# Patient Record
Sex: MALE | Employment: OTHER | URBAN - METROPOLITAN AREA
[De-identification: names, ages, dates, MRNs, and addresses within clinical notes are randomized per-mention and may not be internally consistent; named-entity substitution may affect disease eponyms.]

---

## 2024-03-01 ENCOUNTER — MEDICAL CORRESPONDENCE (OUTPATIENT)
Dept: HEALTH INFORMATION MANAGEMENT | Facility: CLINIC | Age: 64
End: 2024-03-01

## 2024-04-26 RX ORDER — GABAPENTIN 100 MG/1
100 CAPSULE ORAL 2 TIMES DAILY
COMMUNITY

## 2024-04-26 RX ORDER — NORTRIPTYLINE HCL 25 MG
25 CAPSULE ORAL AT BEDTIME
COMMUNITY

## 2024-04-26 RX ORDER — HYDROCODONE BITARTRATE AND ACETAMINOPHEN 5; 325 MG/1; MG/1
1 TABLET ORAL DAILY PRN
Status: ON HOLD | COMMUNITY
End: 2024-05-03

## 2024-04-26 RX ORDER — FUROSEMIDE 20 MG
40 TABLET ORAL DAILY
COMMUNITY

## 2024-04-26 RX ORDER — ALLOPURINOL 100 MG/1
100 TABLET ORAL DAILY
COMMUNITY

## 2024-04-26 RX ORDER — GENTAMICIN SULFATE 1 MG/G
CREAM TOPICAL DAILY
COMMUNITY

## 2024-04-26 RX ORDER — ASPIRIN 81 MG/1
81 TABLET ORAL DAILY
Status: ON HOLD | COMMUNITY
End: 2024-05-03

## 2024-04-26 RX ORDER — SPIRONOLACTONE 25 MG/1
25 TABLET ORAL DAILY
COMMUNITY

## 2024-04-26 RX ORDER — CARVEDILOL 25 MG/1
25 TABLET ORAL 2 TIMES DAILY WITH MEALS
COMMUNITY

## 2024-04-26 RX ORDER — ROSUVASTATIN CALCIUM 5 MG/1
5 TABLET, COATED ORAL DAILY
COMMUNITY

## 2024-04-26 RX ORDER — LISINOPRIL 20 MG/1
20 TABLET ORAL DAILY
COMMUNITY

## 2024-04-26 RX ORDER — CELECOXIB 100 MG/1
100 CAPSULE ORAL 2 TIMES DAILY
COMMUNITY

## 2024-04-26 RX ORDER — TADALAFIL 20 MG/1
20 TABLET ORAL DAILY PRN
COMMUNITY

## 2024-05-01 ENCOUNTER — ANESTHESIA EVENT (OUTPATIENT)
Dept: SURGERY | Facility: CLINIC | Age: 64
End: 2024-05-01
Payer: MEDICARE

## 2024-05-01 NOTE — ANESTHESIA PREPROCEDURE EVALUATION
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   Not on File   Social History     Tobacco Use     Smoking status: Former     Types: Cigarettes     Smokeless tobacco: Former     Quit date: 1996   Substance Use Topics     Alcohol use: Not on file      Wt Readings from Last 1 Encounters:   No data found for Wt        Anesthesia Evaluation            ROS/MED HX  ENT/Pulmonary:     (+) sleep apnea, uses CPAP,                                      Neurologic:     (+)    peripheral neuropathy,                            Cardiovascular:     (+) Dyslipidemia hypertension- -  CAD -  - -                                 Previous cardiac testing   Echo: Date: 24 Results:  Summary:    * The left ventricle is normal in size.     * The estimated ejection fraction is 60-65%.     * There is mild concentric left ventricular hypertrophy.     * The left ventricular diastolic function is abnormal (Grade I).     * Normal right ventricular systolic function.     * The aortic valve is calcified.     * There is  "moderate aortic stenosis with a peak velocity of  347 cm/s,   mean   gradient of  29.04 mmHg, aortic valve area of  0.93 cm2, and an NDSI of    0.28.    * Mild mitral annular calcification.     * The IVC is normal in size (< 2.1 cm), > 50% respiratory variance, RA   pressure normal at 3 mmHg.     * Prior study from 02/06/2023.     * Compared to prior study, there is no significant change.     Stress Test:  Date: Results:    ECG Reviewed:  Date: Results:    Cath:  Date: Results:      METS/Exercise Tolerance:     Hematologic:       Musculoskeletal:       GI/Hepatic:       Renal/Genitourinary:     (+) renal disease, type: CRI,            Endo:     (+)               Obesity,       Psychiatric/Substance Use:  - neg psychiatric ROS     Infectious Disease:       Malignancy:       Other:            Physical Exam    Airway        Mallampati: II   TM distance: > 3 FB   Neck ROM: full   Mouth opening: > 3 cm    Respiratory Devices and Support         Dental       (+) Modest Abnormalities - crowns, retainers, 1 or 2 missing teeth      Cardiovascular   cardiovascular exam normal          Pulmonary   pulmonary exam normal            OUTSIDE LABS:  CBC: No results found for: \"WBC\", \"HGB\", \"HCT\", \"PLT\"  BMP: No results found for: \"NA\", \"POTASSIUM\", \"CHLORIDE\", \"CO2\", \"BUN\", \"CR\", \"GLC\"  COAGS: No results found for: \"PTT\", \"INR\", \"FIBR\"  POC: No results found for: \"BGM\", \"HCG\", \"HCGS\"  HEPATIC: No results found for: \"ALBUMIN\", \"PROTTOTAL\", \"ALT\", \"AST\", \"GGT\", \"ALKPHOS\", \"BILITOTAL\", \"BILIDIRECT\", \"LISA\"  OTHER: No results found for: \"PH\", \"LACT\", \"A1C\", \"DIEGO\", \"PHOS\", \"MAG\", \"LIPASE\", \"AMYLASE\", \"TSH\", \"T4\", \"T3\", \"CRP\", \"SED\"    Anesthesia Plan    ASA Status:  3    NPO Status:  NPO Appropriate    Anesthesia Type: General.     - Airway: LMA   Induction: Intravenous, Propofol.   Maintenance: Balanced.        Consents    Anesthesia Plan(s) and associated risks, benefits, and realistic alternatives discussed. Questions answered and " patient/representative(s) expressed understanding.     - Discussed:     - Discussed with:  Patient            Postoperative Care    Pain management: Peripheral nerve block (Single Shot), Multi-modal analgesia.   PONV prophylaxis: Ondansetron (or other 5HT-3), Dexamethasone or Solumedrol     Comments:               Matt Sorto, DO, DO    I have reviewed the pertinent notes and labs in the chart from the past 30 days and (re)examined the patient.  Any updates or changes from those notes are reflected in this note.             # Drug Induced Platelet Defect: home medication list includes an antiplatelet medication

## 2024-05-02 ENCOUNTER — APPOINTMENT (OUTPATIENT)
Dept: GENERAL RADIOLOGY | Facility: CLINIC | Age: 64
End: 2024-05-02
Attending: ORTHOPAEDIC SURGERY
Payer: MEDICARE

## 2024-05-02 ENCOUNTER — HOSPITAL ENCOUNTER (OUTPATIENT)
Facility: CLINIC | Age: 64
Discharge: HOME | End: 2024-05-03
Attending: ORTHOPAEDIC SURGERY | Admitting: ORTHOPAEDIC SURGERY
Payer: MEDICARE

## 2024-05-02 ENCOUNTER — ANESTHESIA (OUTPATIENT)
Dept: SURGERY | Facility: CLINIC | Age: 64
End: 2024-05-02
Payer: MEDICARE

## 2024-05-02 DIAGNOSIS — Z98.890 HISTORY OF ANKLE SURGERY: ICD-10-CM

## 2024-05-02 PROBLEM — M19.079 ANKLE ARTHRITIS: Status: ACTIVE | Noted: 2024-05-02

## 2024-05-02 LAB — GLUCOSE BLDC GLUCOMTR-MCNC: 114 MG/DL (ref 70–99)

## 2024-05-02 PROCEDURE — 250N000011 HC RX IP 250 OP 636: Performed by: ANESTHESIOLOGY

## 2024-05-02 PROCEDURE — 360N000083 HC SURGERY LEVEL 3 W/ FLUORO, PER MIN: Performed by: ORTHOPAEDIC SURGERY

## 2024-05-02 PROCEDURE — 271N000001 HC OR GENERAL SUPPLY NON-STERILE: Performed by: ORTHOPAEDIC SURGERY

## 2024-05-02 PROCEDURE — 250N000025 HC SEVOFLURANE, PER MIN: Performed by: ORTHOPAEDIC SURGERY

## 2024-05-02 PROCEDURE — 250N000011 HC RX IP 250 OP 636: Performed by: NURSE ANESTHETIST, CERTIFIED REGISTERED

## 2024-05-02 PROCEDURE — 250N000011 HC RX IP 250 OP 636: Performed by: ORTHOPAEDIC SURGERY

## 2024-05-02 PROCEDURE — L8699 PROSTHETIC IMPLANT NOS: HCPCS | Performed by: ORTHOPAEDIC SURGERY

## 2024-05-02 PROCEDURE — 710N000009 HC RECOVERY PHASE 1, LEVEL 1, PER MIN: Performed by: ORTHOPAEDIC SURGERY

## 2024-05-02 PROCEDURE — 370N000017 HC ANESTHESIA TECHNICAL FEE, PER MIN: Performed by: ORTHOPAEDIC SURGERY

## 2024-05-02 PROCEDURE — 272N000001 HC OR GENERAL SUPPLY STERILE: Performed by: ORTHOPAEDIC SURGERY

## 2024-05-02 PROCEDURE — 250N000009 HC RX 250: Performed by: ORTHOPAEDIC SURGERY

## 2024-05-02 PROCEDURE — 272N000002 HC OR SUPPLY OTHER OPNP: Performed by: ORTHOPAEDIC SURGERY

## 2024-05-02 PROCEDURE — 250N000013 HC RX MED GY IP 250 OP 250 PS 637

## 2024-05-02 PROCEDURE — 250N000009 HC RX 250: Performed by: NURSE ANESTHETIST, CERTIFIED REGISTERED

## 2024-05-02 PROCEDURE — 250N000009 HC RX 250

## 2024-05-02 PROCEDURE — 999N000141 HC STATISTIC PRE-PROCEDURE NURSING ASSESSMENT: Performed by: ORTHOPAEDIC SURGERY

## 2024-05-02 PROCEDURE — 250N000009 HC RX 250: Performed by: ANESTHESIOLOGY

## 2024-05-02 PROCEDURE — 999N000179 XR SURGERY CARM FLUORO LESS THAN 5 MIN W STILLS

## 2024-05-02 PROCEDURE — 258N000003 HC RX IP 258 OP 636: Performed by: ANESTHESIOLOGY

## 2024-05-02 PROCEDURE — 27870 FUSION OF ANKLE JOINT OPEN: CPT | Performed by: ANESTHESIOLOGY

## 2024-05-02 PROCEDURE — C1713 ANCHOR/SCREW BN/BN,TIS/BN: HCPCS | Performed by: ORTHOPAEDIC SURGERY

## 2024-05-02 PROCEDURE — 250N000011 HC RX IP 250 OP 636

## 2024-05-02 PROCEDURE — 82962 GLUCOSE BLOOD TEST: CPT

## 2024-05-02 PROCEDURE — 258N000003 HC RX IP 258 OP 636: Performed by: NURSE ANESTHETIST, CERTIFIED REGISTERED

## 2024-05-02 PROCEDURE — 27870 FUSION OF ANKLE JOINT OPEN: CPT | Performed by: NURSE ANESTHETIST, CERTIFIED REGISTERED

## 2024-05-02 PROCEDURE — 258N000003 HC RX IP 258 OP 636

## 2024-05-02 PROCEDURE — C1762 CONN TISS, HUMAN(INC FASCIA): HCPCS | Performed by: ORTHOPAEDIC SURGERY

## 2024-05-02 DEVICE — GRAFT BONE INFUSE BMP SM 7510200: Type: IMPLANTABLE DEVICE | Site: ANKLE | Status: FUNCTIONAL

## 2024-05-02 RX ORDER — HYDROMORPHONE HCL IN WATER/PF 6 MG/30 ML
0.4 PATIENT CONTROLLED ANALGESIA SYRINGE INTRAVENOUS EVERY 5 MIN PRN
Status: DISCONTINUED | OUTPATIENT
Start: 2024-05-02 | End: 2024-05-02

## 2024-05-02 RX ORDER — ALLOPURINOL 100 MG/1
100 TABLET ORAL DAILY
Status: DISCONTINUED | OUTPATIENT
Start: 2024-05-03 | End: 2024-05-03 | Stop reason: HOSPADM

## 2024-05-02 RX ORDER — AMOXICILLIN 250 MG
1 CAPSULE ORAL 2 TIMES DAILY
Status: DISCONTINUED | OUTPATIENT
Start: 2024-05-02 | End: 2024-05-03 | Stop reason: HOSPADM

## 2024-05-02 RX ORDER — OXYCODONE HYDROCHLORIDE 5 MG/1
10 TABLET ORAL EVERY 4 HOURS PRN
Status: DISCONTINUED | OUTPATIENT
Start: 2024-05-02 | End: 2024-05-03 | Stop reason: HOSPADM

## 2024-05-02 RX ORDER — LISINOPRIL 20 MG/1
20 TABLET ORAL DAILY
Status: DISCONTINUED | OUTPATIENT
Start: 2024-05-02 | End: 2024-05-03 | Stop reason: HOSPADM

## 2024-05-02 RX ORDER — OXYCODONE HYDROCHLORIDE 5 MG/1
5-10 TABLET ORAL EVERY 4 HOURS PRN
Qty: 30 TABLET | Refills: 0 | Status: SHIPPED | OUTPATIENT
Start: 2024-05-02

## 2024-05-02 RX ORDER — SODIUM CHLORIDE, SODIUM LACTATE, POTASSIUM CHLORIDE, CALCIUM CHLORIDE 600; 310; 30; 20 MG/100ML; MG/100ML; MG/100ML; MG/100ML
INJECTION, SOLUTION INTRAVENOUS CONTINUOUS
Status: DISCONTINUED | OUTPATIENT
Start: 2024-05-02 | End: 2024-05-02 | Stop reason: HOSPADM

## 2024-05-02 RX ORDER — ONDANSETRON 2 MG/ML
4 INJECTION INTRAMUSCULAR; INTRAVENOUS EVERY 30 MIN PRN
Status: DISCONTINUED | OUTPATIENT
Start: 2024-05-02 | End: 2024-05-02

## 2024-05-02 RX ORDER — ONDANSETRON 4 MG/1
4 TABLET, ORALLY DISINTEGRATING ORAL EVERY 30 MIN PRN
Status: DISCONTINUED | OUTPATIENT
Start: 2024-05-02 | End: 2024-05-02

## 2024-05-02 RX ORDER — AMOXICILLIN 250 MG
1-2 CAPSULE ORAL 2 TIMES DAILY
Qty: 10 TABLET | Refills: 0 | Status: SHIPPED | OUTPATIENT
Start: 2024-05-02

## 2024-05-02 RX ORDER — ACETAMINOPHEN 325 MG/1
650 TABLET ORAL EVERY 4 HOURS PRN
Status: DISCONTINUED | OUTPATIENT
Start: 2024-05-05 | End: 2024-05-03 | Stop reason: HOSPADM

## 2024-05-02 RX ORDER — PROCHLORPERAZINE MALEATE 5 MG
5 TABLET ORAL EVERY 6 HOURS PRN
Status: DISCONTINUED | OUTPATIENT
Start: 2024-05-02 | End: 2024-05-03 | Stop reason: HOSPADM

## 2024-05-02 RX ORDER — HYDROMORPHONE HCL IN WATER/PF 6 MG/30 ML
0.4 PATIENT CONTROLLED ANALGESIA SYRINGE INTRAVENOUS
Status: DISCONTINUED | OUTPATIENT
Start: 2024-05-02 | End: 2024-05-03 | Stop reason: HOSPADM

## 2024-05-02 RX ORDER — NALOXONE HYDROCHLORIDE 0.4 MG/ML
0.2 INJECTION, SOLUTION INTRAMUSCULAR; INTRAVENOUS; SUBCUTANEOUS
Status: DISCONTINUED | OUTPATIENT
Start: 2024-05-02 | End: 2024-05-03 | Stop reason: HOSPADM

## 2024-05-02 RX ORDER — LIDOCAINE 40 MG/G
CREAM TOPICAL
Status: DISCONTINUED | OUTPATIENT
Start: 2024-05-02 | End: 2024-05-02 | Stop reason: HOSPADM

## 2024-05-02 RX ORDER — SODIUM CHLORIDE, SODIUM LACTATE, POTASSIUM CHLORIDE, CALCIUM CHLORIDE 600; 310; 30; 20 MG/100ML; MG/100ML; MG/100ML; MG/100ML
INJECTION, SOLUTION INTRAVENOUS CONTINUOUS
Status: DISCONTINUED | OUTPATIENT
Start: 2024-05-02 | End: 2024-05-03 | Stop reason: HOSPADM

## 2024-05-02 RX ORDER — BISACODYL 10 MG
10 SUPPOSITORY, RECTAL RECTAL DAILY PRN
Status: DISCONTINUED | OUTPATIENT
Start: 2024-05-05 | End: 2024-05-03 | Stop reason: HOSPADM

## 2024-05-02 RX ORDER — ONDANSETRON 2 MG/ML
4 INJECTION INTRAMUSCULAR; INTRAVENOUS EVERY 6 HOURS PRN
Status: DISCONTINUED | OUTPATIENT
Start: 2024-05-02 | End: 2024-05-03 | Stop reason: HOSPADM

## 2024-05-02 RX ORDER — CEFAZOLIN SODIUM/WATER 2 G/20 ML
2 SYRINGE (ML) INTRAVENOUS
Status: COMPLETED | OUTPATIENT
Start: 2024-05-02 | End: 2024-05-02

## 2024-05-02 RX ORDER — NALOXONE HYDROCHLORIDE 0.4 MG/ML
0.1 INJECTION, SOLUTION INTRAMUSCULAR; INTRAVENOUS; SUBCUTANEOUS
Status: DISCONTINUED | OUTPATIENT
Start: 2024-05-02 | End: 2024-05-02

## 2024-05-02 RX ORDER — HYDROXYZINE HYDROCHLORIDE 10 MG/1
10 TABLET, FILM COATED ORAL EVERY 6 HOURS PRN
Status: DISCONTINUED | OUTPATIENT
Start: 2024-05-02 | End: 2024-05-03 | Stop reason: HOSPADM

## 2024-05-02 RX ORDER — IBUPROFEN 600 MG/1
600 TABLET, FILM COATED ORAL EVERY 6 HOURS PRN
Status: DISCONTINUED | OUTPATIENT
Start: 2024-05-02 | End: 2024-05-03 | Stop reason: HOSPADM

## 2024-05-02 RX ORDER — ASPIRIN 325 MG
325 TABLET, DELAYED RELEASE (ENTERIC COATED) ORAL DAILY
Status: DISCONTINUED | OUTPATIENT
Start: 2024-05-02 | End: 2024-05-03 | Stop reason: HOSPADM

## 2024-05-02 RX ORDER — CEFAZOLIN SODIUM/WATER 2 G/20 ML
2 SYRINGE (ML) INTRAVENOUS SEE ADMIN INSTRUCTIONS
Status: DISCONTINUED | OUTPATIENT
Start: 2024-05-02 | End: 2024-05-02 | Stop reason: HOSPADM

## 2024-05-02 RX ORDER — FENTANYL CITRATE 0.05 MG/ML
50 INJECTION, SOLUTION INTRAMUSCULAR; INTRAVENOUS EVERY 5 MIN PRN
Status: DISCONTINUED | OUTPATIENT
Start: 2024-05-02 | End: 2024-05-02

## 2024-05-02 RX ORDER — SPIRONOLACTONE 25 MG/1
25 TABLET ORAL DAILY
Status: DISCONTINUED | OUTPATIENT
Start: 2024-05-03 | End: 2024-05-03 | Stop reason: HOSPADM

## 2024-05-02 RX ORDER — CEFAZOLIN SODIUM/WATER 2 G/20 ML
2 SYRINGE (ML) INTRAVENOUS EVERY 8 HOURS
Qty: 40 ML | Refills: 0 | Status: DISCONTINUED | OUTPATIENT
Start: 2024-05-02 | End: 2024-05-02

## 2024-05-02 RX ORDER — HYDROMORPHONE HCL IN WATER/PF 6 MG/30 ML
0.2 PATIENT CONTROLLED ANALGESIA SYRINGE INTRAVENOUS
Status: DISCONTINUED | OUTPATIENT
Start: 2024-05-02 | End: 2024-05-03 | Stop reason: HOSPADM

## 2024-05-02 RX ORDER — TRANEXAMIC ACID 10 MG/ML
1 INJECTION, SOLUTION INTRAVENOUS ONCE
Status: DISCONTINUED | OUTPATIENT
Start: 2024-05-02 | End: 2024-05-02 | Stop reason: HOSPADM

## 2024-05-02 RX ORDER — PROPOFOL 10 MG/ML
INJECTION, EMULSION INTRAVENOUS PRN
Status: DISCONTINUED | OUTPATIENT
Start: 2024-05-02 | End: 2024-05-02

## 2024-05-02 RX ORDER — ACETAMINOPHEN 325 MG/1
975 TABLET ORAL EVERY 8 HOURS
Status: DISCONTINUED | OUTPATIENT
Start: 2024-05-02 | End: 2024-05-03 | Stop reason: HOSPADM

## 2024-05-02 RX ORDER — MAGNESIUM HYDROXIDE 1200 MG/15ML
LIQUID ORAL PRN
Status: DISCONTINUED | OUTPATIENT
Start: 2024-05-02 | End: 2024-05-02 | Stop reason: HOSPADM

## 2024-05-02 RX ORDER — ONDANSETRON 4 MG/1
4 TABLET, ORALLY DISINTEGRATING ORAL EVERY 6 HOURS PRN
Status: DISCONTINUED | OUTPATIENT
Start: 2024-05-02 | End: 2024-05-03 | Stop reason: HOSPADM

## 2024-05-02 RX ORDER — CARVEDILOL 6.25 MG/1
25 TABLET ORAL 2 TIMES DAILY WITH MEALS
Status: DISCONTINUED | OUTPATIENT
Start: 2024-05-02 | End: 2024-05-03

## 2024-05-02 RX ORDER — HYDROMORPHONE HCL IN WATER/PF 6 MG/30 ML
0.2 PATIENT CONTROLLED ANALGESIA SYRINGE INTRAVENOUS EVERY 5 MIN PRN
Status: DISCONTINUED | OUTPATIENT
Start: 2024-05-02 | End: 2024-05-02

## 2024-05-02 RX ORDER — FENTANYL CITRATE 0.05 MG/ML
25 INJECTION, SOLUTION INTRAMUSCULAR; INTRAVENOUS EVERY 5 MIN PRN
Status: DISCONTINUED | OUTPATIENT
Start: 2024-05-02 | End: 2024-05-02

## 2024-05-02 RX ORDER — GABAPENTIN 100 MG/1
100 CAPSULE ORAL 2 TIMES DAILY
Status: DISCONTINUED | OUTPATIENT
Start: 2024-05-02 | End: 2024-05-03 | Stop reason: HOSPADM

## 2024-05-02 RX ORDER — ASPIRIN 325 MG
325 TABLET, DELAYED RELEASE (ENTERIC COATED) ORAL DAILY
Qty: 42 TABLET | Refills: 0 | Status: SHIPPED | OUTPATIENT
Start: 2024-05-02

## 2024-05-02 RX ORDER — DEXMEDETOMIDINE HYDROCHLORIDE 4 UG/ML
INJECTION, SOLUTION INTRAVENOUS PRN
Status: DISCONTINUED | OUTPATIENT
Start: 2024-05-02 | End: 2024-05-02

## 2024-05-02 RX ORDER — OXYCODONE HYDROCHLORIDE 5 MG/1
5 TABLET ORAL EVERY 4 HOURS PRN
Status: DISCONTINUED | OUTPATIENT
Start: 2024-05-02 | End: 2024-05-03 | Stop reason: HOSPADM

## 2024-05-02 RX ORDER — ROSUVASTATIN CALCIUM 5 MG/1
5 TABLET, COATED ORAL DAILY
Status: DISCONTINUED | OUTPATIENT
Start: 2024-05-03 | End: 2024-05-03 | Stop reason: HOSPADM

## 2024-05-02 RX ORDER — NORTRIPTYLINE HCL 25 MG
25 CAPSULE ORAL AT BEDTIME
Status: DISCONTINUED | OUTPATIENT
Start: 2024-05-02 | End: 2024-05-03 | Stop reason: HOSPADM

## 2024-05-02 RX ORDER — CEFAZOLIN SODIUM 2 G/100ML
2 INJECTION, SOLUTION INTRAVENOUS EVERY 8 HOURS
Status: COMPLETED | OUTPATIENT
Start: 2024-05-02 | End: 2024-05-03

## 2024-05-02 RX ORDER — NALOXONE HYDROCHLORIDE 0.4 MG/ML
0.4 INJECTION, SOLUTION INTRAMUSCULAR; INTRAVENOUS; SUBCUTANEOUS
Status: DISCONTINUED | OUTPATIENT
Start: 2024-05-02 | End: 2024-05-03 | Stop reason: HOSPADM

## 2024-05-02 RX ORDER — SODIUM CHLORIDE, SODIUM LACTATE, POTASSIUM CHLORIDE, CALCIUM CHLORIDE 600; 310; 30; 20 MG/100ML; MG/100ML; MG/100ML; MG/100ML
INJECTION, SOLUTION INTRAVENOUS CONTINUOUS
Status: DISCONTINUED | OUTPATIENT
Start: 2024-05-02 | End: 2024-05-02

## 2024-05-02 RX ORDER — DEXAMETHASONE SODIUM PHOSPHATE 4 MG/ML
4 INJECTION, SOLUTION INTRA-ARTICULAR; INTRALESIONAL; INTRAMUSCULAR; INTRAVENOUS; SOFT TISSUE
Status: COMPLETED | OUTPATIENT
Start: 2024-05-02 | End: 2024-05-02

## 2024-05-02 RX ORDER — LIDOCAINE HYDROCHLORIDE 20 MG/ML
INJECTION, SOLUTION INFILTRATION; PERINEURAL PRN
Status: DISCONTINUED | OUTPATIENT
Start: 2024-05-02 | End: 2024-05-02

## 2024-05-02 RX ORDER — FUROSEMIDE 40 MG
40 TABLET ORAL DAILY
Status: DISCONTINUED | OUTPATIENT
Start: 2024-05-02 | End: 2024-05-03 | Stop reason: HOSPADM

## 2024-05-02 RX ORDER — LIDOCAINE 40 MG/G
CREAM TOPICAL
Status: DISCONTINUED | OUTPATIENT
Start: 2024-05-02 | End: 2024-05-03 | Stop reason: HOSPADM

## 2024-05-02 RX ORDER — HYDROXYZINE HYDROCHLORIDE 10 MG/1
10 TABLET, FILM COATED ORAL EVERY 6 HOURS PRN
Qty: 30 TABLET | Refills: 0 | Status: SHIPPED | OUTPATIENT
Start: 2024-05-02

## 2024-05-02 RX ORDER — TRANEXAMIC ACID 10 MG/ML
1 INJECTION, SOLUTION INTRAVENOUS ONCE
Status: COMPLETED | OUTPATIENT
Start: 2024-05-02 | End: 2024-05-02

## 2024-05-02 RX ORDER — POLYETHYLENE GLYCOL 3350 17 G/17G
17 POWDER, FOR SOLUTION ORAL DAILY
Status: DISCONTINUED | OUTPATIENT
Start: 2024-05-03 | End: 2024-05-03 | Stop reason: HOSPADM

## 2024-05-02 RX ADMIN — PROPOFOL 300 MG: 10 INJECTION, EMULSION INTRAVENOUS at 07:47

## 2024-05-02 RX ADMIN — TRANEXAMIC ACID 1 G: 10 INJECTION, SOLUTION INTRAVENOUS at 09:45

## 2024-05-02 RX ADMIN — BUPIVACAINE HYDROCHLORIDE 25 ML: 5 INJECTION, SOLUTION EPIDURAL; INTRACAUDAL at 07:05

## 2024-05-02 RX ADMIN — CARVEDILOL 25 MG: 6.25 TABLET, FILM COATED ORAL at 18:22

## 2024-05-02 RX ADMIN — SODIUM CHLORIDE, POTASSIUM CHLORIDE, SODIUM LACTATE AND CALCIUM CHLORIDE: 600; 310; 30; 20 INJECTION, SOLUTION INTRAVENOUS at 07:38

## 2024-05-02 RX ADMIN — SODIUM CHLORIDE, POTASSIUM CHLORIDE, SODIUM LACTATE AND CALCIUM CHLORIDE: 600; 310; 30; 20 INJECTION, SOLUTION INTRAVENOUS at 06:58

## 2024-05-02 RX ADMIN — DEXMEDETOMIDINE HYDROCHLORIDE 4 MCG: 200 INJECTION INTRAVENOUS at 09:38

## 2024-05-02 RX ADMIN — FUROSEMIDE 40 MG: 40 TABLET ORAL at 13:06

## 2024-05-02 RX ADMIN — SENNOSIDES AND DOCUSATE SODIUM 1 TABLET: 50; 8.6 TABLET ORAL at 21:06

## 2024-05-02 RX ADMIN — SENNOSIDES AND DOCUSATE SODIUM 1 TABLET: 50; 8.6 TABLET ORAL at 13:06

## 2024-05-02 RX ADMIN — DEXAMETHASONE SODIUM PHOSPHATE 4 MG: 4 INJECTION, SOLUTION INTRA-ARTICULAR; INTRALESIONAL; INTRAMUSCULAR; INTRAVENOUS; SOFT TISSUE at 07:52

## 2024-05-02 RX ADMIN — MIDAZOLAM 2 MG: 1 INJECTION INTRAMUSCULAR; INTRAVENOUS at 07:40

## 2024-05-02 RX ADMIN — PHENYLEPHRINE HYDROCHLORIDE 200 MCG: 10 INJECTION INTRAVENOUS at 08:07

## 2024-05-02 RX ADMIN — PHENYLEPHRINE HYDROCHLORIDE 150 MCG: 10 INJECTION INTRAVENOUS at 08:01

## 2024-05-02 RX ADMIN — TRANEXAMIC ACID 1 G: 10 INJECTION, SOLUTION INTRAVENOUS at 07:45

## 2024-05-02 RX ADMIN — GABAPENTIN 100 MG: 100 CAPSULE ORAL at 13:06

## 2024-05-02 RX ADMIN — ASPIRIN 325 MG: 325 TABLET, COATED ORAL at 13:06

## 2024-05-02 RX ADMIN — LIDOCAINE HYDROCHLORIDE 60 MG: 20 INJECTION, SOLUTION INFILTRATION; PERINEURAL at 07:47

## 2024-05-02 RX ADMIN — SODIUM CHLORIDE, POTASSIUM CHLORIDE, SODIUM LACTATE AND CALCIUM CHLORIDE: 600; 310; 30; 20 INJECTION, SOLUTION INTRAVENOUS at 12:05

## 2024-05-02 RX ADMIN — LISINOPRIL 20 MG: 20 TABLET ORAL at 13:06

## 2024-05-02 RX ADMIN — Medication 3 G: at 07:38

## 2024-05-02 RX ADMIN — GABAPENTIN 100 MG: 100 CAPSULE ORAL at 21:06

## 2024-05-02 RX ADMIN — CEFAZOLIN SODIUM 2 G: 2 INJECTION, SOLUTION INTRAVENOUS at 16:22

## 2024-05-02 RX ADMIN — DEXMEDETOMIDINE HYDROCHLORIDE 8 MCG: 200 INJECTION INTRAVENOUS at 09:36

## 2024-05-02 RX ADMIN — PHENYLEPHRINE HYDROCHLORIDE 0.5 MCG/KG/MIN: 10 INJECTION INTRAVENOUS at 08:04

## 2024-05-02 RX ADMIN — PHENYLEPHRINE HYDROCHLORIDE 200 MCG: 10 INJECTION INTRAVENOUS at 08:03

## 2024-05-02 RX ADMIN — PHENYLEPHRINE HYDROCHLORIDE 200 MCG: 10 INJECTION INTRAVENOUS at 08:19

## 2024-05-02 RX ADMIN — PHENYLEPHRINE HYDROCHLORIDE 100 MCG: 10 INJECTION INTRAVENOUS at 10:07

## 2024-05-02 RX ADMIN — ACETAMINOPHEN 975 MG: 325 TABLET, FILM COATED ORAL at 13:06

## 2024-05-02 RX ADMIN — MIDAZOLAM 2 MG: 1 INJECTION INTRAMUSCULAR; INTRAVENOUS at 07:00

## 2024-05-02 RX ADMIN — FENTANYL CITRATE 25 MCG: 0.05 INJECTION, SOLUTION INTRAMUSCULAR; INTRAVENOUS at 08:44

## 2024-05-02 RX ADMIN — SODIUM CHLORIDE, POTASSIUM CHLORIDE, SODIUM LACTATE AND CALCIUM CHLORIDE: 600; 310; 30; 20 INJECTION, SOLUTION INTRAVENOUS at 10:05

## 2024-05-02 RX ADMIN — FENTANYL CITRATE 50 MCG: 0.05 INJECTION, SOLUTION INTRAMUSCULAR; INTRAVENOUS at 07:47

## 2024-05-02 RX ADMIN — DEXMEDETOMIDINE HYDROCHLORIDE 8 MCG: 200 INJECTION INTRAVENOUS at 08:57

## 2024-05-02 RX ADMIN — ACETAMINOPHEN 975 MG: 325 TABLET, FILM COATED ORAL at 21:06

## 2024-05-02 RX ADMIN — PHENYLEPHRINE HYDROCHLORIDE 100 MCG: 10 INJECTION INTRAVENOUS at 07:56

## 2024-05-02 RX ADMIN — FENTANYL CITRATE 25 MCG: 0.05 INJECTION, SOLUTION INTRAMUSCULAR; INTRAVENOUS at 09:25

## 2024-05-02 RX ADMIN — NORTRIPTYLINE HYDROCHLORIDE 25 MG: 25 CAPSULE ORAL at 21:11

## 2024-05-02 RX ADMIN — PHENYLEPHRINE HYDROCHLORIDE 200 MCG: 10 INJECTION INTRAVENOUS at 07:47

## 2024-05-02 RX ADMIN — ONDANSETRON 4 MG: 2 INJECTION INTRAMUSCULAR; INTRAVENOUS at 07:52

## 2024-05-02 RX ADMIN — PHENYLEPHRINE HYDROCHLORIDE 150 MCG: 10 INJECTION INTRAVENOUS at 08:05

## 2024-05-02 ASSESSMENT — ACTIVITIES OF DAILY LIVING (ADL)
ADLS_ACUITY_SCORE: 34
ADLS_ACUITY_SCORE: 35
ADLS_ACUITY_SCORE: 31
ADLS_ACUITY_SCORE: 34
ADLS_ACUITY_SCORE: 35
ADLS_ACUITY_SCORE: 35
ADLS_ACUITY_SCORE: 34
ADLS_ACUITY_SCORE: 35
ADLS_ACUITY_SCORE: 34
ADLS_ACUITY_SCORE: 35
ADLS_ACUITY_SCORE: 35
ADLS_ACUITY_SCORE: 34

## 2024-05-02 NOTE — OP NOTE
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    PROCEDURE(S):   Right ankle arthrodesis.  Right subtalar arthrodesis (combined as a tibiotalocalcaneal arthrodesis with an intramedullary nail).  Right distal fibula resection.  Right ankle and tibia fluoroscopic imaging and interpretation including AP, mortise, and lateral views.    ATTENDING SURGEON: Denilson Chawla MD.    ASSISTANT SURGEON: Urvashi Curran PA-C.    ANESTHESIA: General with regional nerve block.    EBL: Minimal.    TOURNIQUET TIME: 120 minutes.    IMPLANTS: [employer] DynaNail with associated interlocking screws; distal fibula autograft and crushed cancellous allograft; Infuse.    COMPLICATIONS: Iatrogenic diaphyseal posterior tibia fracture sustained during impaction of the intramedullary nail, and addressed with exchange for a longer nail with fixation proximal to the fracture site.    A skilled surgical assistant, Urvashi Curran PA-C, was necessary and utilized during the case to assist with patient positioning, prepping and draping, completing the soft tissue/bone work, wound closure, and dressing and splint application.  The assistant was utilized throughout the entire case.    INDICATIONS: Isrrael is a pleasant 64 year-old gentleman who presented to my clinic for evaluation of chronic right ankle pain and deformity.  Physical examination and imaging studies demonstrated evidence of end-stage valgus ankle degenerative joint disease with significant erosion of the tibial plafond and talar dome.  Given the patient's chronic discomfort despite extensive conservative measures, the above operative intervention was offered.  After full discussion of the benefits and risks of surgery, the patient provided informed consent to proceed.    The patient was identified in the pre-operative holding area on the date of surgery.  The operative site  was marked with indelible marker and the patient was brought back to the operating room and transferred to the operating table in a supine position.  All bony prominences were well-padded.  Anesthesia was administered without complication.  The right lower extremity was prepped and draped in standard sterile fashion.  A pre-operative timeout was performed identifying the correct patient, procedure, operative site, antibiotic administration, and equipment necessary for the procedure.    After Esmarch exsanguination, an upper thigh tourniquet was inflated.  A longitudinal incision was made directly over the distal fibula.  Soft tissue dissection was carefully carried down maintaining excellent hemostasis and avoiding injury to the superficial peroneal nerve.  The peroneal fascia was sharply incised exposing the distal fibula.  Sharp dissection was carried down adjacent to the distal fibula, isolating the bone from the surrounding soft tissue.  An oscillating saw was utilized to complete an oblique osteotomy through the distal diaphysis of the fibula.  The distal fibula was then resected from the wound, and a small amount of the cancellous bone at the tip of the fibula was harvested for bone graft.    Adequate exposure of the lateral aspect of the ankle and subtalar joints was noted.  End-stage degenerative changes were encountered across the ankle joint line, with substantial erosion of the talar dome and central tibial plafond.  A combination of curved osteotome and curette were utilized to debride the remaining chondral tissue from both sides of the ankle joint.  The wound was copiously irrigated to remove all loose debris.    Severe degenerative changes were encountered throughout the posterior facet of the subtalar joint.  A combination of curved osteotome and curette were utilized debride the remaining chondral tissue from both sides of the joint.  The wound was copiously irrigated to remove all loose  debris.    A combination of 2.0mm drill bit and curved osteotome were utilized to fenestrate the subchondral bone on both sides of the ankle and subtalar joints to prepare each joint for arthrodesis.  A prominent ridge of bone along the medial malleolus was also debrided to allow for adequate correction of the deformity of the talus.    A longitudinal incision was made under the plantar aspect of the heel.  Soft tissue dissection was carefully carried down to the plantar calcaneus.  A guidewire was advanced across the subtalar and ankle joints into the intramedullary canal of the tibia.  Fluoroscopic imaging confirmed appropriate alignment of the guidewire, with excellent overall correction of the valgus ankle and hindfoot deformity.  The wire was overdrilled to prepare a pathway for the TTC nail.  Of note, during the reaming, fluoroscopic imaging confirmed appropriate positioning of the reamers and no evidence of compromise of the posterior cortex of the tibia.  After completing the reaming, a combination of cancellous allograft, autograft from the distal fibula, and Infuse were packed within both the ankle and subtalar joints.    The guidewire was removed and a standard 12mm DynaNail was initially impacted into position.  While advancing the DynaNail, some resistance was encountered, and fluoroscopic imaging of the tibial diaphysis confirmed an iatrogenic, nondisplaced fracture of the posterior cortex of the tibia at the tip of the nail.  In order to stabilize across the fracture site, and ensure stable fixation of the nail, the standard nail was removed and additional reaming of the tibial diaphysis was performed to allow for placement of a long 10mm DynaNail, allowing for placement of the interlocking screws proximal to the iatrogenic tibia fracture site.    After confirming appropriate positioning of the nail, the nail was secured distally to the calcaneus with the appropriate interlocking screws.  6mm of  internal compression was applied through the nitinol component of the nail prior to impaction of the nail.  A drill bit was advanced through the proximal end of the nail and an additional 2 to 3mm of external compression were applied across the ankle and subtalar joints.  Fluoroscopic imaging confirmed excellent alignment of the ankle and subtalar joint with excellent compression of bone across both joints.  The proximal end of the nail was then secured with 2 interlocking screws, inserting the most proximal screw utilizing perfect Unga technique.  The nail jig was removed and an end cap was placed.    Final fluoroscopic images of the right ankle and tibia, including AP, mortise, and lateral views, confirmed appropriate positioning of the nail, excellent correction of the ankle and hindfoot deformity, adequate compression across the ankle and subtalar arthrodesis sites, and no significant propagation of the nondisplaced iatrogenic tibia fracture.    Deep fascia was reapproximated within the distal fibula and plantar heel incisions with 2-0 Vicryl suture.  Wounds were copiously irrigated.  Subcutaneous tissues and skin for all incisions were reapproximated with interrupted 3-0 Monocryl and 3-0 nylon sutures respectively.  Xeroform and sterile dressings were applied.  The patient was placed in a well-padded short leg splint.  The patient was brought to the PACU in stable condition for further postoperative care.    Postoperatively, the patient will remain strictly nonweightbearing on the right lower extremity.  The patient will be placed on aspirin for DVT prophylaxis postoperatively.  The patient will return to clinic for follow-up in 2 weeks for repeat evaluation including wound check, suture removal, and nonweightbearing radiographs of the right ankle and tibia.    Of note, all counts were correct at the conclusion of the case.

## 2024-05-02 NOTE — DISCHARGE INSTRUCTIONS
"**Because you had anesthesia today and your history of sleep apnea, it is extremely important that you use your CPAP machine for the next 24 hours while napping or sleeping.**          [facility]      DISCHARGE INSTRUCTIONS FOLLOWING   REGIONAL BLOCK ANESTHESIA    Numbness or lack of feeling in the arm/leg that was operated may last up to 24 hours.  The average time is usually 10-15 hours.  You may not be able to lift or move the arm or leg where the operation was by itself during that time.  Long-acting local anesthetic medicines were used to give you long-lasting pain relief.  Wear a sling until your arm is completely \"awake\"  Avoid bumping your arm, leg or foot while it is numb  Avoid extremes of hot or cold while it is numb  Remain quiet and restful the day of surgery.  Resume normal activities gradually over the next day or so as advise by your surgeon.  Do not drive or operate  Any machinery until your extremity is full  \"awake\"        You will have a tingling and prickly sensation in your arm/leg as the feeling begins to return; you can also expect some discomfort. The amount of discomfort is unpredictable, but if you have more pain that can be controlled with the pain medication you received, you should contact your surgeon.  Start to take your pain pills as soon as you start to feel any discomfort or pain.                             [facility] Discharge Instructions for  Sedation and General Anesthesia     It's not unusual to feel dizzy, light-headed or faint for up to 24 hours after surgery or while taking pain medication.  If you have these symptoms: sit for a few minutes before standing and have someone assist you when you get up to walk or use the bathroom.    You should rest and relax for the next 24 hours. We recommend you make arrangements to have an adult stay with you for at least 24 hours after your discharge.  Avoid hazardous and strenuous activity.    DO NOT DRIVE any vehicle " or operate mechanical equipment for 24 hours following the end of your surgery.  Even though you may feel normal, your reactions may be affected by the medication you have received.    Do not drink alcoholic beverages for 24 hours following surgery.     Slowly progress to your regular diet as you feel able. It's not unusual to feel nauseated and/or vomit after receiving anesthesia.  If you develop these symptoms, drink clear liquids (apple juice, ginger ale, broth, 7-up, etc. ) until you feel better.  If your nausea and vomiting persists for 24 hours, please notify your surgeon.      All narcotic pain medications, along with inactivity and anesthesia, can cause constipation. Drinking plenty of liquids and increasing fiber intake will help.    For any questions of a medical nature, call your surgeon.    Do not make important decisions for 24 hours.    If you had general anesthesia, you may have a sore throat for a couple of days related to the breathing tube used during surgery.  You may use Cepacol lozenges to help with this discomfort.  If it worsens or if you develop a fever, contact your surgeon.     If you feel your pain is not well managed with the pain medications prescribed by your surgeon, please contact your surgeon's office to let them know so they can address your concerns.      **If you have questions or concerns about your procedure, call   Dr. Chawla at [phone].**              Post-Operative Instruction Sheet for Foot and Ankle Surgery  Denilson Chawla M.D.      These precautions MUST be followed for the first 24 hours after surgery:  Upon discharge, go directly home.  You MUST make arrangements for a responsible adult to stay with you the first night following surgery.  Surgery may be cancelled if you do not have someone that can stay with you.  DO NOT DRINK ALCOHOLIC BEVERAGES.  It is not unusual to feel lightheaded up to 24 hours after surgery or while taking pain medications.  If you feel  lightheaded, sit up for a few minutes before standing and have someone assist you when you get up to walk or use the restroom.  Do not use any mechanical equipment or heavy machinery.  Do not make any important or legal decisions for 24 hours or while on pain medication.  You may experience dry mouth, sore throat, or sleep disturbances from the anesthesia and medications used during surgery.  Generalized muscle aches can sometimes occur.  These symptoms generally disappear by 24 hours.    The following are general guidelines and instructions about what to expect the first weeks following surgery.  These are not specific, and your recovery may be slightly different.  Please follow the instructions that are specifically written out for you after the surgery if there are any questions.    Pain Management   If you have received a nerve block, the pain relief can last anywhere from 12-30 hours, this also means you may not have sensation or movement in your foot for that amount of time. You will have pain after the block wears off!  Anticipate this and start pain meds prior to the block wearing off.   When you get home start taking your over the counter pain medications as well as the non narcotic prescribed medication right away.  If your nerve block is still intact when going to sleep I would advise taking your narcotic even if you don't have pain.  Continue to take your medications as prescribed for the first 24-48 hours - ensure that the patient (you) are alert and have no difficulty breathing before taking the medication.  Often it may be helpful to set an alarm throughout the night to ensure you don t miss a dose of the medication and wake up with a lot more pain.  You can expect that the first two nights will be the most painful and uncomfortable. I will give you strong medication to make you as comfortable as possible, but you may still have some break-through pain.  This is not unexpected, as there are many nerve  endings in the foot and ankle and many patients state the pain from foot surgery is worse than most other injuries or procedures!  After the first few days, take the medication as needed for pain.  As your pain improves, you can gradually decrease your pain meds by utilizing Tylenol or an anti-inflammatory medication.  You should also use these medications as directed early in the post-operative process to supplement the narcotic pain medication.    Dressing   Bleeding through the dressings is quite common.  This usually occurs for the first 1-2 hours after surgery. The actual bleeding has stopped by the time you see the drainage through your dressings.  You can reinforce the outside of the dressing with gauze and an Ace wrap unless otherwise directed.  In most cases, your first dressing change will be performed at your first clinic follow-up visit.  In some cases, I may allow you to change your dressings sooner.  Your dressing should be kept DRY at all times - do not shower, bathe, or wet your dressing in any way after surgery!    Wound Care  Once your stitches are removed, you can shower with soapy water and gently cleanse the incision if it is completely dry.   Do not shower or wash the incision(s) if parts of the incision(s) are open or still draining.  Do not soak the incision(s) in a bathtub or hot tub until the incision(s) is completely dry for one week.  Do not soak the incision(s) in lake or ocean water for at least one month post-operatively.    Elevation   I recommend strict elevation of your foot above the level of your heart for 4-5 days.  Elevation of the foot remains important up to two weeks after surgery to limit swelling and help wound healing.  Elevate your foot/ankle to at least your waist level but above your heart would be best. The more you elevate your foot and ankle, the less pain you will have.   In the first few days following surgery, restrict the time your foot is  down  to 10 minutes or  less at a time.  It is also good to keep your blood flowing through the operated leg and limit the risk of blood clots.  The first day following surgery, I would encourage you to get up and move around the house for a few minutes every hour and then return to elevating the foot.  After the strict elevation period (see above) is completed, you may gradually become more active. You should  listen  to your foot/ankle as to when to get off of it and elevate it again.  This may help even months after surgery.  Remember: avoid anything that hurts or makes your foot/ankle swell!    Icing   Icing can be very useful to decrease the pain and swelling of the foot.   Start by first placing a large garbage bag over the dressing.  Ice may then be placed around the extremity by using either bags of ice taped around the extremity   or you may place the extremity in a bucket filled with ice (the dressing MUST be covered with a plastic bag!).  Bags of frozen peas work well!  You should ice for no more than 20 minutes at a time and repeat at 2 hour intervals.   Do NOT place ice directly on your skin or dressing.     Activity   In most cases (unless otherwise instructed), you may not bear weight on your operated foot/ankle for 2 weeks after surgery.  That means the foot may not touch the ground when upright!  Specific weight bearing instructions for your surgery will be provided on the day of surgery.  Your toes may experience bruising after surgery and become darker when the foot hangs down.  Unless you had surgery on those toes, it is important to actively wiggle your toes for 5-10 minutes each hour.  Many of your questions can be addressed at your 2-week follow-up appointment - please make a list of things to ask us as they come up during your recovery.    What to watch for    Severe swelling and/or pain in the calf: this could indicate a deep vein thrombosis (blood clot in leg) which requires urgent evaluation and  treatment!  Profuse bleeding: that which soaks through your dressing and increases in size throughout the first day after surgery.  Blue or white toes: this indicates a lack of blood flow to the foot.   Fever greater than 101.5: fevers less than this are very common the first few days after surgery and are unlikely to indicate infection or any unexpected problem.  Severe pain: that which does not improve after pain medication, except for the first two nights.   If you have any of the above problems or any concerns, please contact my office ([phone]) and further instructions will be provided.  If you are unable to reach anyone or feel you have a medical emergency, please do not hesitate to go to the nearest urgent care or emergency department.    Medications   *Medications may take up to 24 hours to be refilled by my office.*    All pain medications, along with inactivity following surgery, can cause constipation.  Use the stool softeners as recommended, increase fluid intake to at least 1 quart per day, and increase your dietary fiber.  (The  p  fruits - peaches, plums, pears, and prunes - as well as anise/black licorice are generally helpful.)    Antibiotics may be prescribed to limit the risk of infection only in limited circumstances.  Kelfex (cephalexin) 500 mg - This is an antibiotic given in addition to the antibiotic given during surgery to help reduce the chance of post-operative infection.   Dosage: 1 tablet by mouth 4 times a day.  OR   Cleocin (clindamycin) 600 mg - This is an antibiotic given to those patients who are allergic to penicillin in addition to the antibiotic given during surgery to help reduce the chance of post-operative infection.   Dosage: 1 tablet by mouth 3 times a day.    Anti-nausea and spasms  Hydroxyzine 25 mg  - This medicine should be taken if you experience any nausea or vomiting. If you know you are sensitive to narcotics please take 1 tablet 30 minutes prior to pain  medication. This may also help with any mild itching experienced with the pain medication or muscle spasms.  Dosage: 1 tablet by mouth every 6 hours as needed for nausea, itching, spasms, or adjuvant pain control.    Pain medications (you will only be given a prescription for ONE of the following!)   Ultram (tramadol) 50mg - This is a pain medication that should be taken EVERY 4 TO 6 HOURS for pain relief. You should start the medication when you arrive home after surgery, before the nerve block wears off.  The first 1-2 nights you may need to take 2 tablets every 4 hours.  You should be given enough medication to last to the first office visit.  This medication cannot be refilled over the phone!  Dosage: 1-2 tablets every 4-6 hours as needed for pain relief.  OR  Oxycodone 5mg - This is a pain medication that may be taken EVERY 3 to 4 HOURS for pain relief.  You should start the medication when you arrive home after surgery, before the nerve block wears off.  The first 1-2 nights you may need to take up to 2 or even 3 tablets every 3-4 hours. You should be given enough medication to last to the first office visit.  This medication cannot be refilled over the phone!  Dosage: 1-2 (or 3) tablets every 3-4 hours as needed for pain relief.  OR  Norco (hydrocodone/acetominophen) 5/325 mg - This is a pain medication that should be taken EVERY 4 TO 6 HOURS for pain relief. You should start the medication when you arrive home after surgery, before the nerve block wears off.  The first 1-2 nights you may need to take 2 tablets every 4 hours.  You should be given enough medication to last to the first office visit.  This medication cannot be refilled over the phone!  Dosage: 1-2 tablets every 4-6 hours as needed for pain relief.  *Do NOT take any Tylenol while taking this medication!  You may alternate Tylenol with this medication provided you do not take greater than 3 grams of Tylenol in total over a 24 hour time  period.    Blood thinner  Depending on the type of surgery and your personal risk factors, I may prescribe a medication to help limit the risk of developing a blood clot after your surgery.  The length of time to take the recommended medication will be provided and typically lasts until you are moving about normally or bearing weight on the operated foot/ankle.  ECASA (enteric coated aspirin) 325mg tablet daily.  Lovenox (enoxaparin) 40mg subcutaneous injection daily.  Xarelto 10mg tablet daily.    Stool Softener  Take an over the counter stool softener such as senna or Miralax starting the day after your surgery to prevent constipation. This is a common side effect of the narcotic pain medications.  You may stop taking this after you have regular bowel movements or no longer require use of narcotic pain medications.    Dental Implications  Dental procedures should be avoided until your incisions are healed. Furthermore, surgical procedures including hardware or an allograft require taking an antibiotic within one hour of all dental work within 6 months of surgery. Total ankle replacements require indefinite use of antibiotics prior to dental procedures. We would be happy to provide you with the necessary prescription upon request.    Follow-up Visits  You should have your initial post-operative visits already scheduled but if you do not recall the exact dates or do not believe they have been scheduled, please contact [facility] right away to ensure that we have the appropriate visits in the system.    You will likely follow-up with my physician assistant for your first post-operative visit and for a few of the additional follow-up visits.  He works directly with me on all patients and will be able to inform me if there are any concerns during your recovery process!        You can often find additional information about your procedure or condition on the [facility] website at [email or web address] or  [email or web address].    Additional information from reputable orthopaedic foot and ankle surgeons affiliated with the American Orthopaedic Foot and Ankle Society can be found at [email or web address].      Post-operative Foot and Ankle Surgery Instructions and Tips for Pain Control  Dr. Chawla, [facility]    Non-medication Interventions  Read your post-operative instruction handout!  Elevate the leg at the heart level 95% of the time for the first 2-3 days.  Limit the amount of time the foot and ankle are  down  for no more than 10 minutes at a time the first few days.  Ice consistently for the first 2-3 days.  If on bare skin or a thin dressing, limit icing to 20 minutes per hour.  If around a splint, apply the ice behind the knee for 20 minutes per hours or over the splint around the ankle as much as tolerated.  Do not plan extra activity for the first 2-3 days after surgery.  Expect the foot or ankle will be painful - surgery hurts!  The pain will get better.  Trust the process.    Non-opiate Medications  Start these medications right away after you get home from surgery and continue on a regular schedule for at least 3 days.  As you pain allows, start to use as needed until your first clinic visit after surgery.  It may be helpful to alternate the ibuprofen/Celebrex and Tylenol to maximize pain relief.  In rare cases, I may recommend avoiding ibuprofen or aleve after surgery - this will be made clear at the time of surgery.  Motrin or Advil (ibuprofen) 600mg every 6 hours OR Celebrex 100mg every 12 hours.  Tylenol (acetaminophen) 650mg every 6 hours.    Neurontin (gabapentin) 300mg every 8 hours for the first 3 days only.    Hydroxyzine 25mg (or 10mg if >65 years of age) every 6 hours.    Opiate Medications  These medications should be used sparingly, just as needed, and for the first few days after surgery.  Please see the below guidelines for details.  Ultram  (tramadol) 50mg, 1-2 tablets every 4 hours as needed.  OR  Oxycodone 5mg, 1-2 tablets every 3 hours as needed.    Opiate pain medications can be very effective, but carry a number of potentially harmful side effects including tolerance and addiction if used inappropriately.  They will be the most effective the first few days following surgery in the following situations:  If you had a nerve block before surgery, take one pill a few hours after you get home from the surgery center.  Keep to a regular schedule with the medication, taking just one pill every 4 hours until the block wears off (tingling, pins and needles, increased movement in the toes, increased pain, etc.).  Take 1-2 pills again 4 hours later.  Take 1-2 pills at bedtime the first few days after surgery to help sleep and limit pain through the night.  Take 1-2 pills for  rescue  when pain is not controlled by the regularly scheduled medications and non-medication interventions.  You should generally feel less need for the opiate pain medication after the first few days after surgery.  Remember, foot and ankle surgery hurts!  The goal of medication management is to  take the edge off  and keep the pain level tolerable.  Trust the process - the pain will get better!    Multiple studies indicate that opioid pain medication is not needed beyond a few days postoperatively and prolonged use of these medications can actually lead to increased perception of pain and tolerance/addiction issues.  The CDC and state boards have been recommending and enforcing restrictions on opioid prescribing in light of the significant consequences that arise from chronic opioid use and [facility] is adopting many of these recommendations for your safety and well-being.      Miller et al, Satisfaction with Pain Relief After Operative Treatment of an Ankle Fracture, Injury. 2012; 43(11):1958-61.  Miller et al, Pain Relief After Operative Treatment of an Extremity Fracture, JBJS  Am. 2017; 99:1908-15.  Ricky et al, Support for Safer Opioid Prescribing Practices, JBJS Am. 2017; 99:1945-55.  Donell et al, Liposomal Bupivacaine in Forefoot Surgery, Foot Ankle Int. 2015; 36(5):503-7.  Ra carlos al, Addition of Pregabalin to Multimodal Analgesic Therapy Following Ankle Surgery, Reg Anesth Pain Med. 2012; 37(3):302-7.

## 2024-05-02 NOTE — ANESTHESIA PROCEDURE NOTES
Sciatic Procedure Note    Pre-Procedure   Staff -        Anesthesiologist:  Matt Sorto DO       Performed By: anesthesiologist       Location: pre-op       Pre-Anesthestic Checklist: patient identified, IV checked, site marked, risks and benefits discussed, informed consent, monitors and equipment checked, pre-op evaluation, at physician/surgeon's request and post-op pain management  Timeout:       Correct Patient: Yes        Correct Procedure: Yes        Correct Site: Yes        Correct Position: Yes        Correct Laterality: Yes        Site Marked: Yes  Procedure Documentation  Procedure: Sciatic       Laterality: right       Patient Position: supine       Patient Prep/Sterile Barriers: sterile gloves, mask, patient draped       Skin prep: Chloraprep       Local skin infiltrated with 3 mL of 1% lidocaine.  (popliteal approach).       Needle Type: insulated and short bevel       Needle Gauge: 21.        Needle Length (Inches): 4        Ultrasound guided       1. Ultrasound was used to identify targeted nerve, plexus, vascular marker, or fascial plane and place a needle adjacent to it in real-time.       2. Ultrasound was used to visualize the spread of anesthetic in close proximity to the above referenced structure.       3. A permanent image is entered into the patient's record.    Assessment/Narrative         The placement was negative for: blood aspirated, painful injection and site bleeding       Paresthesias: No.       Test dose of mL at.         Test dose negative, 3 minutes after injection, for signs of intravascular, subdural, or intrathecal injection.       Bolus given via needle..        Secured via.        Insertion/Infusion Method: Single Shot       Complications: none    Medication(s) Administered   Bupivacaine 0.5% w/ 1:400K Epi (Injection) - Injection   25 mL - 5/2/2024 7:05:00 AM   Comments:  Ultrasound Interpretation, peripheral nerve block    1.  Under ultrasound guidance, the  "needle was inserted and placed in close proximity to the target nerve(s).  2. Ultrasound was also used to visualize the spread of the anesthetic in close proximity to the nerve(s) being blocked.  25 ml of 0.5% Bupivacaine w/ 1:400K Epi, in total, was administered in incremental doses, with intermittent negative aspiration.     3. The nerve(s) appeared anatomically normal.  4. There were no apparent abnormal pathological findings.  5. A permanent ultrasound image was saved in the patient's record.    Pt tolerated the procedure well.     The surgeon has given a verbal order transferring care of this patient to me for the performance of a regional analgesia block for post-op pain control. It is requested of me because I am uniquely trained and qualified to perform this block and the surgeon is neither trained nor qualified to perform this procedure.      FOR [facility] ([facility] ONLY:   Pain Team Contact information: please page the Pain Team Via [employer]. Search \"Pain\". During daytime hours, please page the attending first. At night please page the resident first.      "

## 2024-05-02 NOTE — ANESTHESIA PROCEDURE NOTES
Saphenous Procedure Note    Pre-Procedure   Staff -        Anesthesiologist:  Matt Sorto DO       Performed By: anesthesiologist       Location: pre-op       Pre-Anesthestic Checklist: patient identified, IV checked, site marked, risks and benefits discussed, informed consent, monitors and equipment checked, pre-op evaluation, at physician/surgeon's request and post-op pain management  Timeout:       Correct Patient: Yes        Correct Procedure: Yes        Correct Site: Yes        Correct Position: Yes        Correct Laterality: Yes        Site Marked: Yes  Procedure Documentation  Procedure: Saphenous       Laterality: right       Patient Position: supine       Patient Prep/Sterile Barriers: sterile gloves, mask, patient draped       Skin prep: Chloraprep       Local skin infiltrated with 3 mL of 1% lidocaine.        Needle Type: insulated and short bevel       Needle Gauge: 21.        Needle Length (Inches): 4        Ultrasound guided       1. Ultrasound was used to identify targeted nerve, plexus, vascular marker, or fascial plane and place a needle adjacent to it in real-time.       2. Ultrasound was used to visualize the spread of anesthetic in close proximity to the above referenced structure.       3. A permanent image is entered into the patient's record.    Assessment/Narrative         The placement was negative for: blood aspirated, painful injection and site bleeding       Paresthesias: No.       Test dose of mL at.         Test dose negative, 3 minutes after injection, for signs of intravascular, subdural, or intrathecal injection.       Bolus given via needle..        Secured via.        Insertion/Infusion Method: Single Shot       Complications: none    Medication(s) Administered   Bupivacaine 0.5% w/ 1:400K Epi (Injection) - Injection   10 mL - 5/2/2024 7:03:00 AM   Comments:  Ultrasound Interpretation, peripheral nerve block    1.  Under ultrasound guidance, the needle was inserted  "and placed in close proximity to the target nerve(s).  2. Ultrasound was also used to visualize the spread of the anesthetic in close proximity to the nerve(s) being blocked.  10 ml of 0.5% Bupivacaine w/ 1:400K Epi, in total, was administered in incremental doses, with intermittent negative aspiration.     3. The nerve(s) appeared anatomically normal.  4. There were no apparent abnormal pathological findings.  5. A permanent ultrasound image was saved in the patient's record.    Pt tolerated the procedure well.     The surgeon has given a verbal order transferring care of this patient to me for the performance of a regional analgesia block for post-op pain control. It is requested of me because I am uniquely trained and qualified to perform this block and the surgeon is neither trained nor qualified to perform this procedure.      FOR [facility] ([facility]) ONLY:   Pain Team Contact information: please page the Pain Team Via [employer]. Search \"Pain\". During daytime hours, please page the attending first. At night please page the resident first.      "

## 2024-05-02 NOTE — ANESTHESIA POSTPROCEDURE EVALUATION
Patient: Sami Mcintosh    Procedure: Procedure(s):  RIGHT ANKLE TIBIOTALOCALCANEAL ARTHRODESIS, distal fibula excision       Anesthesia Type:  General    Note:     Postop Pain Control: Uneventful            Sign Out: Well controlled pain   PONV: No   Neuro/Psych: Uneventful            Sign Out: Acceptable/Baseline neuro status   Airway/Respiratory: Uneventful            Sign Out: Acceptable/Baseline resp. status   CV/Hemodynamics: Uneventful            Sign Out: Acceptable CV status; No obvious hypovolemia; No obvious fluid overload   Other NRE: NONE   DID A NON-ROUTINE EVENT OCCUR?        Last vitals:  Vitals Value Taken Time   BP 92/67 05/02/24 1100   Temp 36.4  C (97.5  F) 05/02/24 1030   Pulse 90 05/02/24 1107   Resp 22 05/02/24 1107   SpO2 95 % 05/02/24 1107   Vitals shown include unfiled device data.    Electronically Signed By: Matt Sorto DO, DO  May 2, 2024  11:08 AM

## 2024-05-02 NOTE — ANESTHESIA CARE TRANSFER NOTE
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    Anesthesia Type:   General     Note:    Oropharynx: oropharynx clear of all foreign objects and spontaneously breathing  Level of Consciousness: awake  Oxygen Supplementation: face mask  Level of Supplemental Oxygen (L/min / FiO2): 6  Independent Airway: airway patency satisfactory and stable  Dentition: dentition unchanged  Vital Signs Stable: post-procedure vital signs reviewed and stable  Report to RN Given: handoff report given  Patient transferred to: PACU    Handoff Report: Identifed the Patient, Identified the Reponsible Provider, Reviewed the pertinent medical history, Discussed the surgical course, Reviewed Intra-OP anesthesia mangement and issues during anesthesia, Set expectations for post-procedure period and Allowed opportunity for questions and acknowledgement of understanding      Vitals:  Vitals Value Taken Time   /75 05/02/24 1018   Temp     Pulse 92 05/02/24 1019   Resp 25 05/02/24 1019   SpO2 98 % 05/02/24 1019   Vitals shown include unfiled device data.    Electronically Signed By: LACHO Burch CRNA  May 2, 2024  10:20 AM

## 2024-05-02 NOTE — ANESTHESIA PROCEDURE NOTES
Airway       Patient location during procedure: OR  Staff -        Anesthesiologist:  Matt Sorto DO       CRNA: Katelynn Tobias APRN CRNA       Other Anesthesia Staff: Mary Herrera RN       Performed By: SRNA  Consent for Airway        Urgency: elective  Indications and Patient Condition       Indications for airway management: yovanny-procedural       Induction type:intravenous       Mask difficulty assessment: 1 - vent by mask    Final Airway Details       Final airway type: supraglottic airway    Supraglottic Airway Details        Type: LMA       Brand: I-Gel       LMA size: 5    Post intubation assessment        Placement verified by: capnometry and chest rise        Number of attempts at approach: 1       Number of other approaches attempted: 0       Secured with: tape       Ease of procedure: easy       Dentition: Intact and Unchanged

## 2024-05-03 ENCOUNTER — APPOINTMENT (OUTPATIENT)
Dept: PHYSICAL THERAPY | Facility: CLINIC | Age: 64
End: 2024-05-03
Attending: ORTHOPAEDIC SURGERY
Payer: MEDICARE

## 2024-05-03 VITALS
DIASTOLIC BLOOD PRESSURE: 68 MMHG | BODY MASS INDEX: 39.94 KG/M2 | HEIGHT: 70 IN | SYSTOLIC BLOOD PRESSURE: 125 MMHG | WEIGHT: 279 LBS | HEART RATE: 88 BPM | RESPIRATION RATE: 18 BRPM | OXYGEN SATURATION: 95 % | TEMPERATURE: 97.9 F

## 2024-05-03 LAB — GLUCOSE BLDC GLUCOMTR-MCNC: 116 MG/DL (ref 70–99)

## 2024-05-03 PROCEDURE — 97162 PT EVAL MOD COMPLEX 30 MIN: CPT | Mod: GP | Performed by: PHYSICAL THERAPIST

## 2024-05-03 PROCEDURE — 97110 THERAPEUTIC EXERCISES: CPT | Mod: GP | Performed by: PHYSICAL THERAPIST

## 2024-05-03 PROCEDURE — 258N000003 HC RX IP 258 OP 636

## 2024-05-03 PROCEDURE — 97530 THERAPEUTIC ACTIVITIES: CPT | Mod: GP | Performed by: PHYSICAL THERAPIST

## 2024-05-03 PROCEDURE — 97116 GAIT TRAINING THERAPY: CPT | Mod: GP,GZ | Performed by: PHYSICAL THERAPIST

## 2024-05-03 PROCEDURE — 250N000013 HC RX MED GY IP 250 OP 250 PS 637

## 2024-05-03 PROCEDURE — 250N000011 HC RX IP 250 OP 636

## 2024-05-03 PROCEDURE — 250N000013 HC RX MED GY IP 250 OP 250 PS 637: Performed by: ORTHOPAEDIC SURGERY

## 2024-05-03 PROCEDURE — 82962 GLUCOSE BLOOD TEST: CPT

## 2024-05-03 PROCEDURE — 250N000011 HC RX IP 250 OP 636: Performed by: ORTHOPAEDIC SURGERY

## 2024-05-03 RX ORDER — CARVEDILOL 25 MG/1
25 TABLET ORAL 2 TIMES DAILY WITH MEALS
Status: DISCONTINUED | OUTPATIENT
Start: 2024-05-03 | End: 2024-05-03 | Stop reason: HOSPADM

## 2024-05-03 RX ADMIN — POLYETHYLENE GLYCOL 3350 17 G: 17 POWDER, FOR SOLUTION ORAL at 07:57

## 2024-05-03 RX ADMIN — OXYCODONE HYDROCHLORIDE 10 MG: 5 TABLET ORAL at 12:54

## 2024-05-03 RX ADMIN — FUROSEMIDE 40 MG: 40 TABLET ORAL at 07:56

## 2024-05-03 RX ADMIN — ROSUVASTATIN CALCIUM 5 MG: 5 TABLET, FILM COATED ORAL at 07:57

## 2024-05-03 RX ADMIN — OXYCODONE HYDROCHLORIDE 10 MG: 5 TABLET ORAL at 04:40

## 2024-05-03 RX ADMIN — SPIRONOLACTONE 25 MG: 25 TABLET ORAL at 07:56

## 2024-05-03 RX ADMIN — HYDROMORPHONE HYDROCHLORIDE 0.2 MG: 0.2 INJECTION, SOLUTION INTRAMUSCULAR; INTRAVENOUS; SUBCUTANEOUS at 03:07

## 2024-05-03 RX ADMIN — ALLOPURINOL 100 MG: 100 TABLET ORAL at 07:57

## 2024-05-03 RX ADMIN — OXYCODONE HYDROCHLORIDE 10 MG: 5 TABLET ORAL at 08:47

## 2024-05-03 RX ADMIN — ACETAMINOPHEN 975 MG: 325 TABLET, FILM COATED ORAL at 12:53

## 2024-05-03 RX ADMIN — CEFAZOLIN SODIUM 2 G: 2 INJECTION, SOLUTION INTRAVENOUS at 00:33

## 2024-05-03 RX ADMIN — CARVEDILOL 25 MG: 25 TABLET, FILM COATED ORAL at 12:54

## 2024-05-03 RX ADMIN — ASPIRIN 325 MG: 325 TABLET, COATED ORAL at 07:57

## 2024-05-03 RX ADMIN — ACETAMINOPHEN 975 MG: 325 TABLET, FILM COATED ORAL at 03:08

## 2024-05-03 RX ADMIN — LISINOPRIL 20 MG: 20 TABLET ORAL at 07:56

## 2024-05-03 RX ADMIN — SODIUM CHLORIDE, POTASSIUM CHLORIDE, SODIUM LACTATE AND CALCIUM CHLORIDE: 600; 310; 30; 20 INJECTION, SOLUTION INTRAVENOUS at 00:35

## 2024-05-03 RX ADMIN — GABAPENTIN 100 MG: 100 CAPSULE ORAL at 07:57

## 2024-05-03 ASSESSMENT — ACTIVITIES OF DAILY LIVING (ADL)
ADLS_ACUITY_SCORE: 34
ADLS_ACUITY_SCORE: 34
ADLS_ACUITY_SCORE: 38
ADLS_ACUITY_SCORE: 38
ADLS_ACUITY_SCORE: 34
ADLS_ACUITY_SCORE: 38
ADLS_ACUITY_SCORE: 38
ADLS_ACUITY_SCORE: 34
ADLS_ACUITY_SCORE: 38
ADLS_ACUITY_SCORE: 34
ADLS_ACUITY_SCORE: 38

## 2024-05-03 NOTE — PROGRESS NOTES
Patient vital signs are at baseline: Yes  Patient able to ambulate as they were prior to admission or with assist devices provided by therapies during their stay:  No,  Reason:  Needs rolling knee scooter,he is NWB  Patient MUST void prior to discharge:  Yes  Patient able to tolerate oral intake:  Yes  Pain has adequate pain control using Oral analgesics:  Yes  Does patient have an identified :  Yes  Has goal D/C date and time been discussed with patient:  Yes

## 2024-05-03 NOTE — PROGRESS NOTES
Patient vital signs are at baseline: Yes  Patient able to ambulate as they were prior to admission or with assist devices provided by therapies during their stay:  Yes  Patient MUST void prior to discharge:  Yes  Patient able to tolerate oral intake:  Yes  Pain has adequate pain control using Oral analgesics:  Yes  Does patient have an identified :  Yes  Has goal D/C date and time been discussed with patient:  Yes    Dx:Right Ankle Arthrodesis  POD#1    A&Ox4.   CMS Intact.   VSS on RA.   Up with Ax1 GB and walker.   Voiding in BR/Urinal.   No PIV Removed for Discharge.  Regular Diet.  Pain controlled with Oxycodone, Tylenol.   Continue to monitor.     Reviewed discharge instructions and medications with patient and spouse:YES  Questions answered:YES  Patient discharged to:Home w/ Spouse  All belongs discharged with patient:YES

## 2024-05-03 NOTE — PROGRESS NOTES
"   05/03/24 0900   Appointment Info   Signing Clinician's Name / Credentials (PT) Sue Wilson, PT, DPT   Rehab Comments (PT) NWB R LE   Living Environment   Living Environment Comments Lives in house w/no stairs; supportive spouse   Self-Care   Usual Activity Tolerance good   Current Activity Tolerance moderate   Regular Exercise No   Equipment Currently Used at Home wheelchair, manual;walker, rolling  (knee scooter)   Fall history within last six months no   Activity/Exercise/Self-Care Comment Has been dealing w/poor mobility w/R LE for 2-3 yrs - intermittently NWB as well.   General Information   Onset of Illness/Injury or Date of Surgery 05/02/24   Referring Physician Urvashi Curran PA-C   Patient/Family Therapy Goals Statement (PT) Feel better, go home   Pertinent History of Current Problem (include personal factors and/or comorbidities that impact the POC) Pt is 64 year-old gentleman w/ chronic right ankle pain and deformity due to end-stage valgus ankle degenerative joint disease with significant erosion of the tibial plafond and talar dome. Pt here for elective Right ankle arthrodesis, R subtalar arthrodesis (combined as a tibiotalocalcaneal arthrodesis with an intramedullary nail). Per operative report, pt had 1 surgical complication: \"Iatrogenic diaphyseal posterior tibia fracture sustained during impaction of the intramedullary nail, and addressed with exchange for a longer nail with fixation proximal to the fracture site.\"   Existing Precautions/Restrictions fall;weight bearing   Weight-Bearing Status - RLE nonweight-bearing   Cognition   Affect/Mental Status (Cognition) WNL   Orientation Status (Cognition) oriented x 4   Pain Assessment   Patient Currently in Pain Yes, see Vital Sign flowsheet   Integumentary/Edema   Integumentary/Edema no deficits were identifed   Posture    Posture Forward head position;Protracted shoulders;Kyphosis   Range of Motion (ROM)   Range of Motion ROM deficits secondary " to surgical procedure   Strength (Manual Muscle Testing)   Strength (Manual Muscle Testing) Able to perform R SLR;Deficits observed during functional mobility   Strength Comments Grossly 4+/5 B LEs (R LE proximally tested only); Fatigues fairly quickly   Bed Mobility   Comment, (Bed Mobility) SBA   Transfers   Comment, (Transfers) CGA   Gait/Stairs (Locomotion)   Comment, (Gait/Stairs) Using knee scooter NWB R LE - CGA   Balance   Balance Comments Using knee scooter; sitting balance unaffected   Sensory Examination   Sensory Perception Comments s/p R ankle arthrodesis   Coordination   Coordination no deficits were identified   Muscle Tone   Muscle Tone no deficits were identified   Clinical Impression   Criteria for Skilled Therapeutic Intervention Yes, treatment indicated   PT Diagnosis (PT) Impaired indep w/fn mob   Influenced by the following impairments Strength, balance, cardiovasc endurance, pain, ROM, weight-bearing status   Functional limitations due to impairments Safety and indep w/fn mob   Clinical Presentation (PT Evaluation Complexity) evolving   Clinical Presentation Rationale Multiple affecting comorbidities, assessment incl strength, balance, fn mob. Evolving presentation   Clinical Decision Making (Complexity) moderate complexity   Planned Therapy Interventions (PT) balance training;bed mobility training;gait training;neuromuscular re-education;postural re-education;strengthening;transfer training;progressive activity/exercise;home program guidelines   Risk & Benefits of therapy have been explained evaluation/treatment results reviewed;care plan/treatment goals reviewed;participants voiced agreement with care plan;participants included;patient;spouse/significant other   PT Total Evaluation Time   PT Eval, Moderate Complexity Minutes (23397) 10   Physical Therapy Goals   PT Frequency One time eval and treatment only   PT Predicted Duration/Target Date for Goal Attainment 05/03/24   PT Goals Bed  Mobility;Transfers;Gait;PT Goal 1   PT: Bed Mobility Modified independent   PT: Transfers Modified independent   PT: Gait Modified independent   PT: Goal 1 Pt will be indep w/HEP program.   Interventions   Interventions Quick Adds Gait Training;Therapeutic Activity;Therapeutic Procedure   Therapeutic Procedure/Exercise   Ther. Procedure: strength, endurance, ROM, flexibillity Minutes (44092) 15   Symptoms Noted During/After Treatment fatigue   Treatment Detail/Skilled Intervention HEP initiated, pt completed proximal LE strengthening execises, indications provided for progression. SLR, hip abd, hp ext,  completed in supine and standing. Pictorial and written handout provided. Pt demonstrating indep w/HEP at end of session.   Therapeutic Activity   Therapeutic Activities: dynamic activities to improve functional performance Minutes (96379) 15   Treatment Detail/Skilled Intervention Amenable, greeted in supine. Completed bed mob x4 w/CGA initially, progressing to mod Indep with cues for sequencing. Good sitting balance. Completed 2x pivot trnasfers using knee scooter and 1x 2WW hopping on unaffected leg transfer bed>chair w/SBA. No nguyen LOB, no knee buckling. Educ provided for safety and indep.   Gait Training   Gait Training Minutes (85000) 15   Symptoms Noted During/After Treatment (Gait Training) fatigue   Treatment Detail/Skilled Intervention Tolerated hallway knee scooter use. Pt fatigued after ~80ft, able to turn safely and return to room. Questions answered about turning radius, safety using brakes.   Distance in Feet 160'   [place] Level (Gait Training) stand-by assist   Weight Bearing (Gait Training) nonweight-bearing   Assistive Device (Gait Training) other (see comments)  (knee scooter)   Impairments (Gait Analysis/Training) pain;balance impaired   PT Discharge Planning   PT Plan d/c IPPT   PT Discharge Recommendation (DC Rec) home with assist   PT Rationale for DC Rec Pt appears safe to d/c home to  prior living situation, will defer PT recs to ortho team. Already owns knee scooter, provided with 2WW for purchase prior to d/c.   PT Brief overview of current status SBA w/2WW or knee scooter   PT Equipment Needed at Discharge walker, rolling   Total Session Time   Timed Code Treatment Minutes 45   Total Session Time (sum of timed and untimed services) 55     [facility]  OUTPATIENT PHYSICAL THERAPY EVALUATION  PLAN OF TREATMENT FOR OUTPATIENT REHABILITATION  (COMPLETE FOR INITIAL CLAIMS ONLY)  Patient's Last Name, First Name, M.I.  YOB: 1960  Sami Mcintosh                           Provider's Name  [facility] Medical Record No.  [id number]                             Onset Date:  05/02/24   Start of Care Date:   5/3/24   Type:     _X_PT   ___OT   ___SLP Medical Diagnosis:                 PT Diagnosis:  Impaired indep w/fn mob Visits from SOC:  1     See note for plan of treatment, functional goals and certification details    I CERTIFY THE NEED FOR THESE SERVICES FURNISHED UNDER        THIS PLAN OF TREATMENT AND WHILE UNDER MY CARE     (Physician co-signature of this document indicates review and certification of the therapy plan).

## 2024-05-03 NOTE — PROGRESS NOTES
"[facility]  Orthopaedics/Foot and Ankle Surgery  Daily Post-Op Note    05/03/2024          Assessment and Plan:    Assessment:   Post-operative day #1  Procedure(s):  RIGHT ANKLE TIBIOTALOCALCANEAL ARTHRODESIS, distal fibula excision (Right)           Plan:   1. NWB RLE.  Keep elevated while at rest to limit swelling and pain.  2. Cont. current pain regimen.  Under appropriate control at this time.  3. PT/OT this morning.  4. ASA, SCDs for DVT prophy.  5. Plan d/c this morning after PT.              Interval History:   No acute events overnight.  Tolerating PO intake.  Block wore off around 3 a.m. but keeping on top of pain with oxycodone.              Physical Exam:   Blood pressure 125/68, pulse 88, temperature 99.5  F (37.5  C), temperature source Oral, resp. rate 18, height 1.778 m (5' 10\"), weight 126.6 kg (279 lb), SpO2 95%.  I/O last 3 completed shifts:  In: 1720 [P.O.:720; I.V.:1000]  Out: 1985 [Urine:1975; Blood:10]    RLE splint c/d/i.  Wiggling toes without difficulty.  SILT sp/dp/plantar nn.  Toes wwp w/ brisk cap refill.  No focal calf swelling or tenderness.             Data:   All laboratory data related to this surgery reviewed.  No lab results found.  No lab results found.   No lab results found.  "

## 2024-05-03 NOTE — PLAN OF CARE
R ankle arthrodesis    Orientation: alert and oriented x4    Vitals/Tele: vitals stable on room air, denies pain this shift    IV Access/drains: L PIV infusing LR @ 75ml/hr    Diet: regular    Mobility: assist 1 w/ gb/walker, non-weight bearing to RLE     GI/: continent of bowel and bladder, no BM this shift, last BM 5/1, urinal with good output    Wound/Skin: intact    Consults: PT/OT, SW    Discharge Plan: home tomorrow    See Flow sheets for assessment

## 2024-05-16 ENCOUNTER — DOCUMENTATION ONLY (OUTPATIENT)
Dept: OTHER | Facility: CLINIC | Age: 64
End: 2024-05-16
Payer: COMMERCIAL

## (undated) DEVICE — 2.5MM DRILL BIT

## (undated) DEVICE — SU MONOCRYL 3-0 PS-2 27" Y427H

## (undated) DEVICE — BLADE SAW SAGITTAL 18.5X63X0.64MM 4/2000 SYS 2108-120-006

## (undated) DEVICE — CAST PADDING 4" STERILE 9044S

## (undated) DEVICE — Device

## (undated) DEVICE — GOWN IMPERVIOUS SPECIALTY XLG/XLONG 32474

## (undated) DEVICE — DRAPE C-ARMOR 5 SIDED 5523

## (undated) DEVICE — DECANTER BAG 2002S

## (undated) DEVICE — PREP CHLORAPREP 26ML TINTED HI-LITE ORANGE 930815

## (undated) DEVICE — BLADE CLIPPER 4406

## (undated) DEVICE — IMM LIMB ELEVATOR DC40-0203

## (undated) DEVICE — DRAPE IOBAN INCISE 23X17" 6650EZ

## (undated) DEVICE — SU ETHILON 3-0 PS-2 18" 1669H

## (undated) DEVICE — CAST PADDING 4" UNSTERILE 9044

## (undated) DEVICE — SU VICRYL 2-0 CT-1 27" UND J259H

## (undated) DEVICE — DRAPE C-ARM 60X42" 1013

## (undated) DEVICE — SOL WATER IRRIG 1000ML BOTTLE 2F7114

## (undated) DEVICE — PACK EXTREMITY SOP15EXFSD

## (undated) DEVICE — CAST PLASTER SPLINT 5X30" 7395

## (undated) DEVICE — SYR BULB IRRIG DOVER 60 ML LATEX FREE 67000

## (undated) DEVICE — DRSG XEROFORM 1X8"

## (undated) DEVICE — BNDG ELASTIC 4" DBL LENGTH UNSTERILE 6611-14

## (undated) DEVICE — SU ETHILON 3-0 PS-1 18" 1663G

## (undated) RX ORDER — PROPOFOL 10 MG/ML
INJECTION, EMULSION INTRAVENOUS
Status: DISPENSED
Start: 2024-05-02

## (undated) RX ORDER — BUPIVACAINE HYDROCHLORIDE 2.5 MG/ML
INJECTION, SOLUTION EPIDURAL; INFILTRATION; INTRACAUDAL
Status: DISPENSED
Start: 2024-05-02

## (undated) RX ORDER — VASOPRESSIN 20 U/ML
INJECTION PARENTERAL
Status: DISPENSED
Start: 2024-05-02

## (undated) RX ORDER — DEXAMETHASONE SODIUM PHOSPHATE 4 MG/ML
INJECTION, SOLUTION INTRA-ARTICULAR; INTRALESIONAL; INTRAMUSCULAR; INTRAVENOUS; SOFT TISSUE
Status: DISPENSED
Start: 2024-05-02

## (undated) RX ORDER — CEFAZOLIN SODIUM/WATER 3 G/30 ML
SYRINGE (ML) INTRAVENOUS
Status: DISPENSED
Start: 2024-05-02

## (undated) RX ORDER — CEFAZOLIN SODIUM/WATER 2 G/20 ML
SYRINGE (ML) INTRAVENOUS
Status: DISPENSED
Start: 2024-05-02

## (undated) RX ORDER — FENTANYL CITRATE 50 UG/ML
INJECTION, SOLUTION INTRAMUSCULAR; INTRAVENOUS
Status: DISPENSED
Start: 2024-05-02

## (undated) RX ORDER — ONDANSETRON 2 MG/ML
INJECTION INTRAMUSCULAR; INTRAVENOUS
Status: DISPENSED
Start: 2024-05-02